# Patient Record
Sex: MALE | NOT HISPANIC OR LATINO | Employment: OTHER | URBAN - METROPOLITAN AREA
[De-identification: names, ages, dates, MRNs, and addresses within clinical notes are randomized per-mention and may not be internally consistent; named-entity substitution may affect disease eponyms.]

---

## 2019-06-12 ENCOUNTER — TRANSFERRED RECORDS (OUTPATIENT)
Dept: HEALTH INFORMATION MANAGEMENT | Facility: CLINIC | Age: 39
End: 2019-06-12

## 2022-02-05 ENCOUNTER — HOSPITAL ENCOUNTER (EMERGENCY)
Facility: CLINIC | Age: 42
Discharge: HOME | End: 2022-02-05
Attending: EMERGENCY MEDICINE | Admitting: EMERGENCY MEDICINE
Payer: COMMERCIAL

## 2022-02-05 ENCOUNTER — NURSE TRIAGE (OUTPATIENT)
Dept: NURSING | Facility: CLINIC | Age: 42
End: 2022-02-05
Payer: COMMERCIAL

## 2022-02-05 VITALS
HEART RATE: 73 BPM | RESPIRATION RATE: 18 BRPM | HEIGHT: 66 IN | TEMPERATURE: 97.1 F | WEIGHT: 140 LBS | SYSTOLIC BLOOD PRESSURE: 113 MMHG | BODY MASS INDEX: 22.5 KG/M2 | OXYGEN SATURATION: 100 % | DIASTOLIC BLOOD PRESSURE: 67 MMHG

## 2022-02-05 DIAGNOSIS — K02.9 DENTAL DECAY: ICD-10-CM

## 2022-02-05 DIAGNOSIS — K08.89 PAIN, DENTAL: ICD-10-CM

## 2022-02-05 PROCEDURE — 64400 NJX AA&/STRD TRIGEMINAL NRV: CPT

## 2022-02-05 PROCEDURE — 250N000013 HC RX MED GY IP 250 OP 250 PS 637: Performed by: EMERGENCY MEDICINE

## 2022-02-05 PROCEDURE — 99283 EMERGENCY DEPT VISIT LOW MDM: CPT | Mod: 25

## 2022-02-05 RX ORDER — OXYCODONE AND ACETAMINOPHEN 5; 325 MG/1; MG/1
2 TABLET ORAL ONCE
Status: COMPLETED | OUTPATIENT
Start: 2022-02-05 | End: 2022-02-05

## 2022-02-05 RX ORDER — OXYCODONE HYDROCHLORIDE 5 MG/1
10 TABLET ORAL ONCE
Status: DISCONTINUED | OUTPATIENT
Start: 2022-02-05 | End: 2022-02-05

## 2022-02-05 RX ADMIN — OXYCODONE HYDROCHLORIDE AND ACETAMINOPHEN 2 TABLET: 5; 325 TABLET ORAL at 12:33

## 2022-02-05 ASSESSMENT — MIFFLIN-ST. JEOR: SCORE: 1477.79

## 2022-02-05 NOTE — DISCHARGE INSTRUCTIONS
Many of these clinics offer a sliding fee option for patients that qualify, and see patients on a walk-in or same day basis. Please call each clinic directly. As services, hours, fees and policies vary greatly.    [place]:  [facility]     [phone]  [facility] ([facility]) [phone]  [facility]  [phone]  [facility]      [phone]   [facility]    [phone]  [facility]  [phone]  [facility] [phone]  [facility]     [phone]  [facility]   [phone]  [facility] (cash only)   [phone]  [facility]    [phone] (adults)          [phone] (children)    [place]:  [facility]     [phone]; [phone]  [facility]     [phone]  [facility]     [phone]  [facility] (free, limited)    [phone]    Multiple Locations:  [facility]       [phone]            Discharge Instructions  Dental Pain    You have been seen today for a toothache. Your pain may be caused by an exposed nerve, an infection (pulpitis), a root abscess (pocket of pus), or other problems. You will need to see a dentist for a solution to your tooth problem. Emergency Department care is only to help control your problem until you can see a dentist; we cannot provide complete dental care.  Today, we did not find any sign that your toothache was caused by any dangerous or life-threatening condition, but sometimes symptoms develop over time and cannot be found during an emergency visit, so it is very important that you follow up with your dentist.      Generally, every Emergency Department visit should have a follow-up clinic visit with either a primary or a specialty clinic/provider. Please  follow-up as instructed by your emergency provider today.    Return to the Emergency Department if:  You develop a new fever over 100.4 F.  You cannot open your mouth normally, cannot move your tongue well, or cannot swallow.  You have new or increased swelling of your face or neck.  You develop drainage of pus or foul smelling material from around your tooth.  What can I do to help myself?  Take any antibiotic the provider may have prescribed for you today.  Avoid very hot or very cold foods as both can cause pain.  Make an appointment to see a dentist as soon as possible. Dentists are generally not  on-staff  at hospitals so we cannot  refer  to you to dentist but we may be able to provide a list of dental clinics to help you.  If you were given a prescription for medicine here today, be sure to read all of the information (including the package insert) that comes with your prescription.  This will include important information about the medicine, its side effects, and any warnings that you need to know about.  The pharmacist who fills the prescription can provide more information and answer questions you may have about the medicine.  If you have questions or concerns that the pharmacist cannot address, please call or return to the Emergency Department.   Remember that you can always come back to the Emergency Department if you are not able to see your regular provider in the amount of time listed above, if you get any new symptoms, or if there is anything that worries you.

## 2022-02-05 NOTE — ED TRIAGE NOTES
Here for dental pain.  States was placed on antibiotics yesterday and having difficulty getting into a dentist due to insurance after recently moving.
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
## 2022-02-06 NOTE — TELEPHONE ENCOUNTER
Call received from Cande pressley  Verbal Consent from pt to speak with Cande    He is calling regarding continuing, severe dental pain.  He has a dental appointment on Mon.  He needs help with pain control until then.    Srinivasa was seen in the ER twice in the past 2 days, for severe dental pain.  2/4/22 - [facility] - started on Amoxicillin  2/5/22 - [facility] - Dental block & single dose of Percocet.    ER advised    COVID 19 Nurse Triage Plan/Patient Instructions    Please be aware that novel coronavirus (COVID-19) may be circulating in the community. If you develop symptoms such as fever, cough, or SOB or if you have concerns about the presence of another infection including coronavirus (COVID-19), please contact your health care provider or visit [email or web address].     Disposition/Instructions    ED Visit recommended. Follow protocol based instructions.     Bring Your Own Device:  Please also bring your smart device(s) (smart phones, tablets, laptops) and their charging cables for your personal use and to communicate with your care team during your visit.    Thank you for taking steps to prevent the spread of this virus.  o Limit your contact with others.  o Wear a simple mask to cover your cough.  o Wash your hands well and often.    Resources    [facility]: About COVID-19: [email or web address]    CDC: What to Do If You're Sick: www.cdc.gov/coronavirus/2019-ncov/about/steps-when-sick.html    CDC: Ending Home Isolation: www.cdc.gov/coronavirus/2019-ncov/hcp/disposition-in-home-patients.html     CDC: Caring for Someone: www.cdc.gov/coronavirus/2019-ncov/if-you-are-sick/care-for-someone.html     [facility]: Interim Guidance for Hospital Discharge to Home: www.health.[facility].[place]/diseases/coronavirus/hcp/hospdischarge.pdf    [facility] clinical trials (COVID-19 research studies): [email or web address]/umn-clinical-trials     Below are the COVID-19 hotlines at the [facility]  Health ([facility]). Interpreters are available.   o For health questions: Call [phone] or [phone] (7 a.m. to 7 p.m.)  o For questions about schools and childcare: Call [phone] or [phone] (7 a.m. to 7 p.m.)     Cecilia Monsivais RN  [facility]      Reason for Disposition    [1] SEVERE pain (e.g., excruciating, unable to do any normal activities) AND [2] not improved 2 hours after pain medicine    Protocols used: TOOTHACHE-A-AH

## 2022-02-22 ENCOUNTER — VIRTUAL VISIT (OUTPATIENT)
Dept: FAMILY MEDICINE | Facility: CLINIC | Age: 42
End: 2022-02-22
Payer: COMMERCIAL

## 2022-02-22 DIAGNOSIS — N28.89 RENAL MASS, RIGHT: Primary | ICD-10-CM

## 2022-02-22 PROCEDURE — 99203 OFFICE O/P NEW LOW 30 MIN: CPT | Mod: 95 | Performed by: FAMILY MEDICINE
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
## 2022-03-15 ENCOUNTER — TELEPHONE (OUTPATIENT)
Dept: FAMILY MEDICINE | Facility: CLINIC | Age: 42
End: 2022-03-15
Payer: COMMERCIAL

## 2022-03-15 NOTE — TELEPHONE ENCOUNTER
Not really sure what this means (MIL?).    Can use any open space to schedule an in person clinic visit if that is what the patient wants.    Fabien Bustillos MD

## 2022-03-15 NOTE — TELEPHONE ENCOUNTER
TC pool  Please help pt to set up an appointment    See messages     Thanks  Alexandria Solis RN   [facility]

## 2022-03-15 NOTE — TELEPHONE ENCOUNTER
Dr Bustillos,    Pt MIL called and wants to have pt schedule an appointment for establishing care, you did a v visit with him on 2/22/22    Please advise    Call Bárbara at   She will relay message to pt    Alexandria Solis RN   [facility]

## 2022-04-28 ENCOUNTER — LAB (OUTPATIENT)
Dept: LAB | Facility: CLINIC | Age: 42
End: 2022-04-28
Payer: COMMERCIAL

## 2022-04-28 ENCOUNTER — OFFICE VISIT (OUTPATIENT)
Dept: INTERNAL MEDICINE | Facility: CLINIC | Age: 42
End: 2022-04-28
Payer: COMMERCIAL

## 2022-04-28 VITALS
WEIGHT: 139 LBS | OXYGEN SATURATION: 99 % | DIASTOLIC BLOOD PRESSURE: 65 MMHG | HEIGHT: 66 IN | SYSTOLIC BLOOD PRESSURE: 100 MMHG | RESPIRATION RATE: 16 BRPM | BODY MASS INDEX: 22.34 KG/M2 | HEART RATE: 83 BPM

## 2022-04-28 DIAGNOSIS — N28.1 KIDNEY CYSTS: ICD-10-CM

## 2022-04-28 DIAGNOSIS — J32.9 CHRONIC SINUSITIS, UNSPECIFIED LOCATION: ICD-10-CM

## 2022-04-28 DIAGNOSIS — Z00.00 PREVENTATIVE HEALTH CARE: ICD-10-CM

## 2022-04-28 DIAGNOSIS — M79.18 MUSCULOSKELETAL PAIN: ICD-10-CM

## 2022-04-28 DIAGNOSIS — K76.9 LIVER LESION: ICD-10-CM

## 2022-04-28 DIAGNOSIS — Z00.00 PREVENTATIVE HEALTH CARE: Primary | ICD-10-CM

## 2022-04-28 DIAGNOSIS — F99 MENTAL HEALTH DISORDER: ICD-10-CM

## 2022-04-28 LAB
ALBUMIN SERPL-MCNC: 4.3 G/DL (ref 3.4–5)
ALP SERPL-CCNC: 62 U/L (ref 40–150)
ALT SERPL W P-5'-P-CCNC: 22 U/L (ref 0–70)
ANION GAP SERPL CALCULATED.3IONS-SCNC: 4 MMOL/L (ref 3–14)
AST SERPL W P-5'-P-CCNC: 15 U/L (ref 0–45)
BASOPHILS # BLD AUTO: 0 10E3/UL (ref 0–0.2)
BASOPHILS NFR BLD AUTO: 1 %
BILIRUB SERPL-MCNC: 0.4 MG/DL (ref 0.2–1.3)
BUN SERPL-MCNC: 15 MG/DL (ref 7–30)
CALCIUM SERPL-MCNC: 9.3 MG/DL (ref 8.5–10.1)
CHLORIDE BLD-SCNC: 107 MMOL/L (ref 94–109)
CHOLEST SERPL-MCNC: 157 MG/DL
CO2 SERPL-SCNC: 30 MMOL/L (ref 20–32)
CREAT SERPL-MCNC: 0.88 MG/DL (ref 0.52–1.25)
EOSINOPHIL # BLD AUTO: 0.1 10E3/UL (ref 0–0.7)
EOSINOPHIL NFR BLD AUTO: 1 %
ERYTHROCYTE [DISTWIDTH] IN BLOOD BY AUTOMATED COUNT: 12 % (ref 10–15)
FASTING STATUS PATIENT QL REPORTED: NORMAL
GFR SERPL CREATININE-BSD FRML MDRD: >90 ML/MIN/1.73M2
GLUCOSE BLD-MCNC: 102 MG/DL (ref 70–99)
HBA1C MFR BLD: 5.3 % (ref 0–5.6)
HCT VFR BLD AUTO: 46.4 % (ref 35–53)
HDLC SERPL-MCNC: 53 MG/DL
HGB BLD-MCNC: 15.1 G/DL (ref 11.7–17.7)
HIV 1+2 AB+HIV1 P24 AG SERPL QL IA: NONREACTIVE
IMM GRANULOCYTES # BLD: 0 10E3/UL
IMM GRANULOCYTES NFR BLD: 0 %
INR PPP: 1.06 (ref 0.85–1.15)
LDLC SERPL CALC-MCNC: 91 MG/DL
LYMPHOCYTES # BLD AUTO: 1.5 10E3/UL (ref 0.8–5.3)
LYMPHOCYTES NFR BLD AUTO: 21 %
MCH RBC QN AUTO: 32 PG (ref 26.5–33)
MCHC RBC AUTO-ENTMCNC: 32.5 G/DL (ref 31.5–36.5)
MCV RBC AUTO: 98 FL (ref 78–100)
MONOCYTES # BLD AUTO: 0.6 10E3/UL (ref 0–1.3)
MONOCYTES NFR BLD AUTO: 8 %
NEUTROPHILS # BLD AUTO: 4.8 10E3/UL (ref 1.6–8.3)
NEUTROPHILS NFR BLD AUTO: 69 %
NONHDLC SERPL-MCNC: 104 MG/DL
NRBC # BLD AUTO: 0 10E3/UL
NRBC BLD AUTO-RTO: 0 /100
PLATELET # BLD AUTO: 209 10E3/UL (ref 150–450)
POTASSIUM BLD-SCNC: 4.8 MMOL/L (ref 3.4–5.3)
PROT SERPL-MCNC: 7 G/DL (ref 6.8–8.8)
RBC # BLD AUTO: 4.72 10E6/UL (ref 3.8–5.9)
SODIUM SERPL-SCNC: 141 MMOL/L (ref 133–144)
TRIGL SERPL-MCNC: 67 MG/DL
WBC # BLD AUTO: 7 10E3/UL (ref 4–11)

## 2022-04-28 PROCEDURE — 83036 HEMOGLOBIN GLYCOSYLATED A1C: CPT | Performed by: PATHOLOGY

## 2022-04-28 PROCEDURE — 80061 LIPID PANEL: CPT | Performed by: PATHOLOGY

## 2022-04-28 PROCEDURE — 87389 HIV-1 AG W/HIV-1&-2 AB AG IA: CPT | Performed by: INTERNAL MEDICINE

## 2022-04-28 PROCEDURE — 99204 OFFICE O/P NEW MOD 45 MIN: CPT | Mod: GC

## 2022-04-28 PROCEDURE — 80053 COMPREHEN METABOLIC PANEL: CPT | Performed by: PATHOLOGY

## 2022-04-28 PROCEDURE — 85025 COMPLETE CBC W/AUTO DIFF WBC: CPT | Performed by: PATHOLOGY

## 2022-04-28 PROCEDURE — 36415 COLL VENOUS BLD VENIPUNCTURE: CPT | Performed by: PATHOLOGY

## 2022-04-28 PROCEDURE — 87902 NFCT AGT GNTYP ALYS HEP C: CPT | Performed by: INTERNAL MEDICINE

## 2022-04-28 PROCEDURE — 86803 HEPATITIS C AB TEST: CPT | Performed by: INTERNAL MEDICINE

## 2022-04-28 PROCEDURE — 85610 PROTHROMBIN TIME: CPT | Performed by: PATHOLOGY

## 2022-04-28 ASSESSMENT — PAIN SCALES - GENERAL: PAINLEVEL: EXTREME PAIN (8)

## 2022-04-28 NOTE — LETTER
May 2, 2022      Srinivasa Matos  [address]  [place] [address]      Oswaldo Bernabe,    It was nice to meet you the other day. I wanted to let you know that all of your labs drawn last week look fine. Your Hepatitis C antibody came back positive but the actual viral test was negative. This means you likely had Hepatitis C in the past but it is now resolved (good news!). Please let us know if you have any questions.    Sincerely,    Abdulaziz Bernabe MD    Resulted Orders   Hepatitis C Screen Reflex to HCV RNA Quant and Genotype   Result Value Ref Range    Hepatitis C Antibody Reactive (A) Nonreactive      Comment:      A reactive result indicates one of the following   1) Current HCV infection   2) Past HCV infection that has resolved or   3) False positivity.     The CDC recommends that a reactive result should be followed by Nucleic acid testing for HCV RNA. If HCV RNA is detected, that indicates current HCV infection.   If HCV RNA is not detected, that indicates either past, resolved HCV infection, or false HCV antibody positivity.    Narrative    Assay performance characteristics have not been established for newborns, infants, and children.   HIV Antigen Antibody Combo   Result Value Ref Range    HIV Antigen Antibody Combo Nonreactive Nonreactive      Comment:      HIV-1 p24 Ag & HIV-1/HIV-2 Ab Not Detected   Comprehensive metabolic panel   Result Value Ref Range    Sodium 141 133 - 144 mmol/L    Potassium 4.8 3.4 - 5.3 mmol/L    Chloride 107 94 - 109 mmol/L      Comment:      0-20 years:       Female:  mmol/L  Male:    mmol/L       20 years and older:   Female:  mmol/L   Male:    mmol/L      Carbon Dioxide (CO2) 30 20 - 32 mmol/L      Comment:      This result was previously suppressed from the chart.    Anion Gap 4 3 - 14 mmol/L      Comment:      This result was previously suppressed from the chart.    Urea Nitrogen 15 7 - 30 mg/dL      Comment:      Female  0 to 15 days            3-23  15 days to 1 year      3-17  1 to 10 years          9-22  10 to 19 years         7-19  19 years and older     7-30    Male  0 to 15 days           3-23  15 days to 1 year      3-17  1 to 10 years          9-22  10 to 19 years         7-21  19 years and older     7-30   This result was previously suppressed from the chart.    Creatinine 0.88 0.52 - 1.25 mg/dL      Comment:      20 y and older Female 0.52-1.04 mg/dL  20 y and older Male 0.66-1.25 mg/dL    Varies with the amount of muscle mass present.    GICH  Female: 0.6-1.2 mg/dL  Male: 0.7-1.3 mg/dL     This result was previously suppressed from the chart.    Calcium 9.3 8.5 - 10.1 mg/dL      Comment:      This result was previously suppressed from the chart.    Glucose 102 (H) 70 - 99 mg/dL      Comment:      This result was previously suppressed from the chart.    Alkaline Phosphatase 62 40 - 150 U/L      Comment:      Female:    0-3 years  110-320 U/L  4-9 years  150-420 U/L  10-11 years  130-560 U/L  12-13 years  105-420 U/L  14-15 years   U/L  16 years and older   U/L      Male:  0-3 years  110-320 U/L  4-9 years  150-420 U/L  10-15 years  130-530 U/L  16-19 years   U/L  20 years and older   U/L     This result was previously suppressed from the chart.    AST 15 0 - 45 U/L      Comment:      This result was previously suppressed from the chart.    ALT 22 0 - 70 U/L      Comment:      Female    All ages 0-50 U/L    Male    0 - 19 years       0-50 U/L  20 years and older 0-70 U/L          This result was previously suppressed from the chart.    Protein Total 7.0 6.8 - 8.8 g/dL      Comment:      This result was previously suppressed from the chart.    Albumin 4.3 3.4 - 5.0 g/dL      Comment:      This result was previously suppressed from the chart.    Bilirubin Total 0.4 0.2 - 1.3 mg/dL      Comment:      This result was previously suppressed from the chart.    GFR Estimate >90 >60 mL/min/1.73m2      Comment:      GFR not  calculated when sex unspecified or nonbinary.  Effective December 21, 2021 eGFRcr in adults is calculated using the 2021 CKD-EPI creatinine equation which includes age and gender (Citlaly et al., [place], DOI: 10.1056/MNFKam2337500)   This result was previously suppressed from the chart.    Narrative    The sex of this patient cannot be reliably determined based on discrepancies in demographics (legal sex, sex assigned at birth, gender identity).  Both male and female reference ranges are provided where applicable.  Careful evaluation of the patient s results as compared to the gender specific reference intervals is required in this setting.    INR   Result Value Ref Range    INR 1.06 0.85 - 1.15   Hemoglobin A1c   Result Value Ref Range    Hemoglobin A1C 5.3 0.0 - 5.6 %      Comment:      Normal <5.7%   Prediabetes 5.7-6.4%    Diabetes 6.5% or higher     Note: Adopted from ADA consensus guidelines.   Lipid Profile NON-FASTING   Result Value Ref Range    Cholesterol 157 <200 mg/dL    Triglycerides 67 <150 mg/dL    Direct Measure HDL 53 >=40 mg/dL    LDL Cholesterol Calculated 91 <=100 mg/dL    Non HDL Cholesterol 104 <130 mg/dL    Patient Fasting > 8hrs? Unknown     Narrative    The sex of this patient cannot be reliably determined based on discrepancies in demographics (legal sex, sex assigned at birth, gender identity).  Both male and female reference ranges are provided where applicable.  Careful evaluation of the patient s results as compared to the gender specific reference intervals is required in this setting.   Cholesterol  Desirable:  <200 mg/dL    Triglycerides  Normal:  Less than 150 mg/dL  Borderline High:  150-199 mg/dL  High:  200-499 mg/dL  Very High:  Greater than or equal to 500 mg/dL    Direct Measure HDL  Female:  Greater than or equal to 50 mg/dL   Male:  Greater than or equal to 40 mg/dL    LDL Cholesterol  Desirable:  <100mg/dL  Above Desirable:  100-129 mg/dL   Borderline High:  130-159 mg/dL    High:  160-189 mg/dL   Very High:  >= 190 mg/dL    Non HDL Cholesterol  Desirable:  130 mg/dL  Above Desirable:  130-159 mg/dL  Borderline High:  160-189 mg/dL  High:  190-219 mg/dL  Very High:  Greater than or equal to 220 mg/dL   CBC with platelets and differential   Result Value Ref Range    WBC Count 7.0 4.0 - 11.0 10e3/uL    RBC Count 4.72 3.80 - 5.90 10e6/uL      Comment:      Reference Range:                                                     Female 3.80-5.20 10e6/uL                                      Male 4.40-5.90 10e6u/L    Hemoglobin 15.1 11.7 - 17.7 g/dL      Comment:      Reference Range:                                                     Female 11.7-15.7 g/dL                                      Male 13.3-17.7 g/dL    Hematocrit 46.4 35.0 - 53.0 %      Comment:      Reference Range:                                                     Female 35.0-47.0 %                                            Male 40.0-54.0 %    MCV 98 78 - 100 fL    MCH 32.0 26.5 - 33.0 pg    MCHC 32.5 31.5 - 36.5 g/dL    RDW 12.0 10.0 - 15.0 %    Platelet Count 209 150 - 450 10e3/uL    % Neutrophils 69 %    % Lymphocytes 21 %    % Monocytes 8 %    % Eosinophils 1 %    % Basophils 1 %    % Immature Granulocytes 0 %    NRBCs per 100 WBC 0 <1 /100    Absolute Neutrophils 4.8 1.6 - 8.3 10e3/uL    Absolute Lymphocytes 1.5 0.8 - 5.3 10e3/uL    Absolute Monocytes 0.6 0.0 - 1.3 10e3/uL    Absolute Eosinophils 0.1 0.0 - 0.7 10e3/uL    Absolute Basophils 0.0 0.0 - 0.2 10e3/uL    Absolute Immature Granulocytes 0.0 <=0.4 10e3/uL    Absolute NRBCs 0.0 10e3/uL    Narrative    The sex of this patient cannot be reliably determined based on discrepancies in demographics (legal sex, sex assigned at birth, gender identity).  Both male and female reference ranges are provided where applicable.  Careful evaluation of the patient s results as compared to the gender specific reference intervals is required in this setting.    Hepatitis C RNA,  Quantitative by PCR with Confirmatory Reflex to Genotyping   Result Value Ref Range    Hepatitis C RNA IU/mL Not Detected Not Detected IU/mL    Narrative    The KASSANDRA AmpliPrep/KASSANDRA TaqMan HCV test is a FDA-approved in vitro nucleic acid amplification test for the quantitation of HCV DNA in human plasma (EDTA plasma) or serum using the KASSANDRA AmpliPrep instrument for automated viral nucleic acid extraction and the KASSANDRA TaqMan for the automated real-time PCR amplification and detection of viral nucleic acid target. Titer results are reported in International Units/mL (IU/mL) using the 1st WHO International standard for HBV for nucleic acid amplification assays.

## 2022-04-28 NOTE — NURSING NOTE
Srinivasa Matos is a 41 year old adult patient that presents today in clinic for the following:    Chief Complaint   Patient presents with     Establish Care     Pt comes into clinic to establish care     Pain     Discuss chronic pain in left leg     Kidney Problem     Discuss cyst on kidney per pt     Medication Request     The patient's allergies and medications were reviewed as noted. A set of vitals were recorded as noted without incident. The patient does not have any other questions for the provider.    Lena Akers, MIKA at 7:20 AM on 4/28/2022
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
## 2022-04-29 LAB — HCV AB SERPL QL IA: REACTIVE

## 2022-04-29 NOTE — TELEPHONE ENCOUNTER
FUTURE VISIT INFORMATION      FUTURE VISIT INFORMATION:    Date: 22    Time: 2PM    Location: [facility]  REFERRAL INFORMATION:    Referring provider:  Nasim Hinton MD    Referring providers clinic:  [facility]     Reason for visit/diagnosis  per Pt, chronic sinusitis, referral from Dr Nasim Hinton, recs in [facility]    RECORDS REQUESTED FROM:       Clinic name Comments Records Status Imaging Status   [facility]  22 note and referral from Nasim Hinton MD [facility]   MRN: [id number]    [place] [address]  Phone: [phone]  Med recs fx. [phone] CT Head and CT Cervical Spine   2/10/14 CT HEAD     *trackin Care everywhere  req 22- received 22 10:54AM Sent a fax to [facility] for images - Amay   22 10:51AM imaging disc received - Amay

## 2022-05-01 LAB — HCV RNA SERPL NAA+PROBE-ACNC: NOT DETECTED IU/ML

## 2022-05-02 NOTE — RESULT ENCOUNTER NOTE
Oswaldo Bernabe, it was nice to meet you the other day. I wanted to let you know that all of your labs drawn last week look fine. Your Hepatitis C antibody came back positive but the actual viral test was negative. This means you likely had Hepatitis C in the past but it is now resolved (good news!). Please let us know if you have any questions.

## 2022-06-02 ENCOUNTER — VIRTUAL VISIT (OUTPATIENT)
Dept: BEHAVIORAL HEALTH | Facility: CLINIC | Age: 42
End: 2022-06-02
Attending: INTERNAL MEDICINE
Payer: COMMERCIAL

## 2022-06-02 DIAGNOSIS — F99 MENTAL HEALTH DISORDER: ICD-10-CM

## 2022-06-02 DIAGNOSIS — Z91.199 NO-SHOW FOR APPOINTMENT: Primary | ICD-10-CM

## 2022-06-02 NOTE — PROGRESS NOTES
This patient was a no show for this scheduled appointment.  They do have an in-person appointment with their new PCP today, and I will reach out to offer to connect again in the future.    Naveed Oakley, [facility]

## 2022-07-22 ENCOUNTER — PRE VISIT (OUTPATIENT)
Dept: OTOLARYNGOLOGY | Facility: CLINIC | Age: 42
End: 2022-07-22
Payer: COMMERCIAL

## 2022-09-12 ENCOUNTER — HOSPITAL ENCOUNTER (EMERGENCY)
Age: 42
Discharge: HOME | End: 2022-09-13
Attending: STUDENT IN AN ORGANIZED HEALTH CARE EDUCATION/TRAINING PROGRAM
Payer: MEDICAID

## 2022-09-12 DIAGNOSIS — F32.A DEPRESSION, UNSPECIFIED DEPRESSION TYPE: Primary | ICD-10-CM

## 2022-09-12 LAB
A/G RATIO: 2 (ref 1.1–2.2)
ACETAMINOPHEN LEVEL: <5 UG/ML (ref 10–30)
ALBUMIN SERPL-MCNC: 5.2 G/DL (ref 3.4–5)
ALP BLD-CCNC: 76 U/L (ref 40–129)
ALT SERPL-CCNC: 15 U/L (ref 10–40)
AMPHETAMINE SCREEN, URINE: ABNORMAL
ANION GAP SERPL CALCULATED.3IONS-SCNC: 14 MMOL/L (ref 3–16)
AST SERPL-CCNC: 19 U/L (ref 15–37)
BARBITURATE SCREEN URINE: ABNORMAL
BASOPHILS ABSOLUTE: 0 K/UL (ref 0–0.2)
BASOPHILS RELATIVE PERCENT: 0.4 %
BENZODIAZEPINE SCREEN, URINE: ABNORMAL
BILIRUB SERPL-MCNC: 0.5 MG/DL (ref 0–1)
BUN BLDV-MCNC: 21 MG/DL (ref 7–20)
CALCIUM SERPL-MCNC: 9.9 MG/DL (ref 8.3–10.6)
CANNABINOID SCREEN URINE: POSITIVE
CHLORIDE BLD-SCNC: 102 MMOL/L (ref 99–110)
CO2: 24 MMOL/L (ref 21–32)
COCAINE METABOLITE SCREEN URINE: ABNORMAL
CREAT SERPL-MCNC: 0.9 MG/DL (ref 0.9–1.3)
EOSINOPHILS ABSOLUTE: 0.1 K/UL (ref 0–0.6)
EOSINOPHILS RELATIVE PERCENT: 0.6 %
ETHANOL: NORMAL MG/DL (ref 0–0.08)
FENTANYL SCREEN, URINE: ABNORMAL
GFR AFRICAN AMERICAN: >60
GFR NON-AFRICAN AMERICAN: >60
GLUCOSE BLD-MCNC: 108 MG/DL (ref 70–99)
HCT VFR BLD CALC: 43.3 % (ref 40.5–52.5)
HEMOGLOBIN: 14.8 G/DL (ref 13.5–17.5)
LYMPHOCYTES ABSOLUTE: 1.5 K/UL (ref 1–5.1)
LYMPHOCYTES RELATIVE PERCENT: 14.7 %
Lab: ABNORMAL
MCH RBC QN AUTO: 33.7 PG (ref 26–34)
MCHC RBC AUTO-ENTMCNC: 34.3 G/DL (ref 31–36)
MCV RBC AUTO: 98.2 FL (ref 80–100)
METHADONE SCREEN, URINE: ABNORMAL
MONOCYTES ABSOLUTE: 0.9 K/UL (ref 0–1.3)
MONOCYTES RELATIVE PERCENT: 8.8 %
NEUTROPHILS ABSOLUTE: 7.5 K/UL (ref 1.7–7.7)
NEUTROPHILS RELATIVE PERCENT: 75.5 %
OPIATE SCREEN URINE: ABNORMAL
OXYCODONE URINE: ABNORMAL
PDW BLD-RTO: 13.1 % (ref 12.4–15.4)
PH UA: 5
PHENCYCLIDINE SCREEN URINE: ABNORMAL
PLATELET # BLD: 201 K/UL (ref 135–450)
PMV BLD AUTO: 6.8 FL (ref 5–10.5)
POTASSIUM REFLEX MAGNESIUM: 4.1 MMOL/L (ref 3.5–5.1)
RBC # BLD: 4.4 M/UL (ref 4.2–5.9)
SALICYLATE, SERUM: <0.3 MG/DL (ref 15–30)
SARS-COV-2, NAAT: NOT DETECTED
SODIUM BLD-SCNC: 140 MMOL/L (ref 136–145)
TOTAL PROTEIN: 7.8 G/DL (ref 6.4–8.2)
WBC # BLD: 10 K/UL (ref 4–11)

## 2022-09-12 PROCEDURE — 80307 DRUG TEST PRSMV CHEM ANLYZR: CPT

## 2022-09-12 PROCEDURE — 87635 SARS-COV-2 COVID-19 AMP PRB: CPT

## 2022-09-12 PROCEDURE — 85025 COMPLETE CBC W/AUTO DIFF WBC: CPT

## 2022-09-12 PROCEDURE — 82077 ASSAY SPEC XCP UR&BREATH IA: CPT

## 2022-09-12 PROCEDURE — 99283 EMERGENCY DEPT VISIT LOW MDM: CPT

## 2022-09-12 PROCEDURE — 80143 DRUG ASSAY ACETAMINOPHEN: CPT

## 2022-09-12 PROCEDURE — 80179 DRUG ASSAY SALICYLATE: CPT

## 2022-09-12 PROCEDURE — 80053 COMPREHEN METABOLIC PANEL: CPT

## 2022-09-12 PROCEDURE — 36415 COLL VENOUS BLD VENIPUNCTURE: CPT

## 2022-09-12 ASSESSMENT — PAIN - FUNCTIONAL ASSESSMENT: PAIN_FUNCTIONAL_ASSESSMENT: NONE - DENIES PAIN

## 2022-09-13 VITALS
DIASTOLIC BLOOD PRESSURE: 77 MMHG | HEART RATE: 89 BPM | BODY MASS INDEX: 24.11 KG/M2 | OXYGEN SATURATION: 98 % | WEIGHT: 150 LBS | RESPIRATION RATE: 18 BRPM | SYSTOLIC BLOOD PRESSURE: 111 MMHG | TEMPERATURE: 98.4 F | HEIGHT: 66 IN

## 2022-09-13 PROBLEM — Z76.5 MALINGERING: Status: ACTIVE | Noted: 2022-09-13

## 2022-09-13 PROCEDURE — 6370000000 HC RX 637 (ALT 250 FOR IP): Performed by: STUDENT IN AN ORGANIZED HEALTH CARE EDUCATION/TRAINING PROGRAM

## 2022-09-13 PROCEDURE — 99281 EMR DPT VST MAYX REQ PHY/QHP: CPT | Performed by: PSYCHIATRY & NEUROLOGY

## 2022-09-13 RX ORDER — CYCLOBENZAPRINE HCL 10 MG
10 TABLET ORAL ONCE
Status: COMPLETED | OUTPATIENT
Start: 2022-09-13 | End: 2022-09-13

## 2022-09-13 RX ORDER — ACETAMINOPHEN 325 MG/1
650 TABLET ORAL EVERY 6 HOURS PRN
OUTPATIENT
Start: 2022-09-13

## 2022-09-13 RX ORDER — HYDROXYZINE 50 MG/1
50 TABLET, FILM COATED ORAL 3 TIMES DAILY PRN
OUTPATIENT
Start: 2022-09-13

## 2022-09-13 RX ORDER — POLYETHYLENE GLYCOL 3350 17 G
2 POWDER IN PACKET (EA) ORAL
OUTPATIENT
Start: 2022-09-13

## 2022-09-13 RX ORDER — TRAZODONE HYDROCHLORIDE 50 MG/1
50 TABLET ORAL NIGHTLY PRN
OUTPATIENT
Start: 2022-09-13

## 2022-09-13 RX ADMIN — CYCLOBENZAPRINE 10 MG: 10 TABLET, FILM COATED ORAL at 05:24

## 2022-09-13 ASSESSMENT — PAIN DESCRIPTION - ORIENTATION: ORIENTATION: LEFT

## 2022-09-13 ASSESSMENT — PAIN SCALES - GENERAL: PAINLEVEL_OUTOF10: 7

## 2022-09-13 ASSESSMENT — PAIN DESCRIPTION - LOCATION: LOCATION: LEG

## 2022-09-13 NOTE — ED NOTES
Patient resting in bed quietly at this time with no complaints. Safety checks continue.       Inez Buckley RN  09/13/22 9743

## 2022-09-13 NOTE — ED NOTES
Flexeril appears to have been effective for left leg pain. Patient resting with eyes closed and no signs of distress.       Blanca Craft RN  09/13/22 8784
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
## 2022-09-13 NOTE — ED NOTES
Patient resting in bed at this time changing positions occasionally. Safety checks continue.       Eloise Durand, DHRUV  09/13/22 0357

## 2022-09-13 NOTE — ED NOTES
Pt arrived to [facility] with Chin Valdes RN. Pt began screaming almost immediately upon arriving to [facility] stating, \"You all aren't doing shit for me! No one will help me. The  won't fucking do shit to help me get back! I need to get back to [place]! I hate this fucking state! \" Writer explained that this hospital was unable to provide transportation to [place]. Pt stated, \"You can't let me out of here. I'll fucking kill myself if you let me out of here. I don't get paid for another 20 something days. \" Staff attempted to redirect pt from screaming and set expectations for behavior while in ED. Pt continued to scream and curse at staff. Security present in [facility]. Will monitor pt.       [address] [place] [address]  09/12/22 0023

## 2022-09-13 NOTE — ED NOTES
Talked with patient about shelter situation. Patient became angry and irritable about not having for sure shelter. Patient made statement \" what am I supposed to do if I get there and can't get in\".       Rukhsana Saenz RN  09/13/22 3517

## 2022-09-13 NOTE — ED NOTES
Patient has order to discharge. Patient given discharge instructions. Patient states he understands. Patient left to lobby to await [employer].      Mary Esquivel RN  09/13/22 8229

## 2022-09-13 NOTE — ED NOTES
Patient given flexeril 10 mg oral for left leg pain 7 out of 10 and explained to patient.       Sita Liang RN  09/13/22 1440

## 2022-09-13 NOTE — ED NOTES
Pt is currently laying  in room. Respirations are even and easy. Independently turning self for comfort. Currently laying on left side. No s/s of distress.       Tejal Banerjee RN  09/13/22 0119

## 2022-09-13 NOTE — DISCHARGE INSTRUCTIONS
Follow-up with primary care physician as well as outpatient resources given to you by mental health. Return to emergency department for anyone to hurt her self or others, hallucinations, chest pain, shortness of breath, abdominal pain, fevers or any new change or worsening symptoms. We are always here for reevaluation never hesitate to return. The National Suicide and Crisis Hotline Number is 65. You can call, chat, or text this number at any time to access emergency mental health services. Shelters:    Emergency [facility]:  [address] [place] [address]  Location: [facility] in [place]  Phone: [phone]  Other: Operated by [address] [place] [address] [employer] and located at the [facility], typically operates from December - February each year and usually serves 600-700 homeless people. Open 7PM - 6AM to men and women. The [facility] is a 10,000 square foot area with its own separate entrance and can house 200 individuals each evening.     [facility]:  [address] [place] [address] St: [facility], [phone] (men) or [phone] (women)  [facility] [phone] ($15/week)  [facility] [phone] (women only)  [place] [phone] (HIV positive only)  [address] [phone] (medical illness only)  Arley Wagoner [phone] (women only domestic violence shelter)  [facility], [phone] or [phone](ITRNEHRM violence shelter)  [facility] [phone] (women only domestic violence shelter)  [employer] [phone] (women only)  [facility] [phone] (women only $60-$75/week)  [address] [place] [address] [phone] (men only)  [address] [facility] [phone] (men only)  [facility] [phone] (men only, veterans)  [place]:  [place] [address] [phone]  [facility] [phone] (women only domestic violence shelter)    [place]:  [place] [address] [phone]  [facility] [phone] (women only domestic violence shelter)   [facility] [phone] (women only domestic violence shelter)    [facility]:  [facility] [phone] ([address], [address], [facility] [phone], M-F, 9-4)    [place]:  Belvia Bosworth [address] [place] [address] Blvd: [facility] [phone]  [facility] [phone]  [facility] [phone]    Other Shelters:  [address] [place] [address] Street: [facility], [phone]  Transitions: [facility], [phone] (dv)     You are being referred to [facility] for mental health treatment. [facility] is located at [address]. Go to the 2nd floor,  Monday-Friday from 9am to 3pm.  When you go, please bring a photo ID, proof of residence in [facility], your insurance card, and these discharge instructions so they can verify you were hospitalized, as well as any medications you are taking. If you have any questions about this, please contact [facility] intake department at [phone]. You are being referred for outpatient treatment at [facility] ([facility]). [facility] has offices located in [facility], [place], and [place]. Please call them directly to schedule an intake appointment. You can reach them at [phone] (Liz/[place]) or [phone] Veronica Pastor    When you go, please bring a photo i.d., proof of residence, proof of household income, insurance cards (if you have them), and this paperwork.

## 2022-09-13 NOTE — ED NOTES
Patient resting quietly in bed with no unsafe behaviors. Safety checks continue.       Jennyfer Duran RN  09/13/22 4368

## 2022-09-13 NOTE — ED NOTES
Level of Care Disposition: Discharge      Patient was seen by ED provider and [facility] staff. The case was presented to psychiatric provider on-call Dr. Juan Abraham. Based on the ED evaluation and information presented to the provider by Suman Quintana RN , it is the recommendation of the on call psychiatric provider that the patient be discharged from a psychiatric standpoint.            Chris Willett RN  09/13/22 [address], RN  09/13/22 9514

## 2022-09-13 NOTE — ED NOTES
Call placed to CAP line [phone]. No answer message left.       Sindhu Young RN  09/13/22 2759
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
## 2022-09-13 NOTE — ED NOTES
Patient asleep. Awaiting placement in shelter or plan. Will continue to monitor patient.       Sindhu Young RN  09/13/22 5691

## 2022-09-13 NOTE — ED PROVIDER NOTES
[address] ED      CHIEF COMPLAINT  Psychiatric Evaluation (Pt on hold. Pt is homeless and from [place]. Patient came to [place] on bus and has no where to go or a way to get home. Patient stated that he would just end his life if no one would help him. )     HISTORY OF PRESENT ILLNESS  Favian Davis is a 42 y.o. male  who presents to the ED complaining of suicidal ideation. Patient was brought in on a hold. He is homeless and from [place] and states that somebody who he was staying with still has disability money and now he cannot get back to [place] so he might as well as kill himself. He denies any plan for killing himself but states \"if I cannot get back and might as well just kill myself and die. \"  Denies other complaints or concerns. Denies homicidal ideation. No other complaints, modifying factors or associated symptoms. I have reviewed the following from the nursing documentation. History reviewed. No pertinent past medical history. History reviewed. No pertinent surgical history. History reviewed. No pertinent family history.   Social History     Socioeconomic History    Marital status: Not on file     Spouse name: Not on file    Number of children: Not on file    Years of education: Not on file    Highest education level: Not on file   Occupational History    Not on file   Tobacco Use    Smoking status: Every Day     Types: Cigarettes    Smokeless tobacco: Not on file   Substance and Sexual Activity    Alcohol use: Not Currently    Drug use: Yes     Types: Marijuana Maurita Handsome)    Sexual activity: Not on file   Other Topics Concern    Not on file   Social History Narrative    Not on file     Social Determinants of Health     Financial Resource Strain: Not on file   Food Insecurity: Not on file   Transportation Needs: Not on file   Physical Activity: Not on file   Stress: Not on file   Social Connections: Not on file   Intimate Partner Violence: Not on file   Housing Stability: Not on file     No current facility-administered medications for this encounter. No current outpatient medications on file. No Known Allergies    REVIEW OF SYSTEMS  10 systems reviewed, pertinent positives per HPI otherwise noted to be negative. PHYSICAL EXAM  /70   Pulse (!) 101   Temp 98 °F (36.7 °C) (Oral)   Resp 16   Ht 5' 6\" (1.676 m)   Wt 150 lb (68 kg)   SpO2 97%   BMI 24.21 kg/m²    GENERAL APPEARANCE: Awake and alert. Cooperative. No acute distress. HENT: Normocephalic. Atraumatic. NECK: Supple. EYES: PERRL. EOM's grossly intact. HEART/CHEST: RRR. No murmurs. LUNGS: Respirations unlabored. CTAB. Good air exchange. Speaking comfortably in full sentences. ABDOMEN: No tenderness. Soft. Non-distended. No masses. No organomegaly. No guarding or rebound. MUSCULOSKELETAL: No extremity edema. Compartments soft. No deformity. No tenderness in the extremities. All extremities neurovascularly intact. SKIN: Warm and dry. No acute rashes. NEUROLOGICAL: Alert and oriented. CN's 2-12 intact. No gross facial drooping. Strength 5/5, sensation intact. Gait normal.  PSYCHIATRIC: Agitated    LABS  I have reviewed all labs for this visit.    Results for orders placed or performed during the hospital encounter of 09/12/22   COVID-19, Rapid    Specimen: Nasopharyngeal Swab   Result Value Ref Range    SARS-CoV-2, NAAT Not Detected Not Detected   CBC with Auto Differential   Result Value Ref Range    WBC 10.0 4.0 - 11.0 K/uL    RBC 4.40 4.20 - 5.90 M/uL    Hemoglobin 14.8 13.5 - 17.5 g/dL    Hematocrit 43.3 40.5 - 52.5 %    MCV 98.2 80.0 - 100.0 fL    MCH 33.7 26.0 - 34.0 pg    MCHC 34.3 31.0 - 36.0 g/dL    RDW 13.1 12.4 - 15.4 %    Platelets 146 123 - 561 K/uL    MPV 6.8 5.0 - 10.5 fL    Neutrophils % 75.5 %    Lymphocytes % 14.7 %    Monocytes % 8.8 %    Eosinophils % 0.6 %    Basophils % 0.4 %    Neutrophils Absolute 7.5 1.7 - 7.7 K/uL    Lymphocytes Absolute 1.5 1.0 - 5.1 K/uL Monocytes Absolute 0.9 0.0 - 1.3 K/uL    Eosinophils Absolute 0.1 0.0 - 0.6 K/uL    Basophils Absolute 0.0 0.0 - 0.2 K/uL   Comprehensive Metabolic Panel w/ Reflex to MG   Result Value Ref Range    Sodium 140 136 - 145 mmol/L    Potassium reflex Magnesium 4.1 3.5 - 5.1 mmol/L    Chloride 102 99 - 110 mmol/L    CO2 24 21 - 32 mmol/L    Anion Gap 14 3 - 16    Glucose 108 (H) 70 - 99 mg/dL    BUN 21 (H) 7 - 20 mg/dL    Creatinine 0.9 0.9 - 1.3 mg/dL    GFR Non-African American >60 >60    GFR African American >60 >60    Calcium 9.9 8.3 - 10.6 mg/dL    Total Protein 7.8 6.4 - 8.2 g/dL    Albumin 5.2 (H) 3.4 - 5.0 g/dL    Albumin/Globulin Ratio 2.0 1.1 - 2.2    Total Bilirubin 0.5 0.0 - 1.0 mg/dL    Alkaline Phosphatase 76 40 - 129 U/L    ALT 15 10 - 40 U/L    AST 19 15 - 37 U/L   Ethanol   Result Value Ref Range    Ethanol Lvl None Detected mg/dL   Acetaminophen Level   Result Value Ref Range    Acetaminophen Level <5 (L) 10 - 30 ug/mL   Salicylate   Result Value Ref Range    Salicylate, Serum <6.8 (L) 15.0 - 30.0 mg/dL   Drug screen multi urine   Result Value Ref Range    Amphetamine Screen, Urine Neg Negative <1000ng/mL    Barbiturate Screen, Ur Neg Negative <200 ng/mL    Benzodiazepine Screen, Urine Neg Negative <200 ng/mL    Cannabinoid Scrn, Ur POSITIVE (A) Negative <50 ng/mL    Cocaine Metabolite Screen, Urine Neg Negative <300 ng/mL    Opiate Scrn, Ur Neg Negative <300 ng/mL    PCP Screen, Urine Neg Negative <25 ng/mL    Methadone Screen, Urine Neg Negative <300 ng/mL    Oxycodone Urine Neg Negative <100 ng/ml    FENTANYL SCREEN, URINE Neg Negative <50 ng/mL    pH, UA 5.0     Drug Screen Comment: see below      RADIOLOGY  No orders to display     ED COURSE/MDM  Patient seen and evaluated. Old records reviewed. Labs and imaging reviewed and results discussed with patient. Patient is a 76-year-old male presenting with concerns for suicidal ideation. Full HPI as detailed above.   Upon arrival in the ED, vitals remarkable for mild tachycardia but otherwise reassuring. Patient is resting comfortably and is in no acute distress. Labs were performed and are reassuring. He was placed on a hold by police prior to arrival in the ED. I have performed a medical clearance examination on this patient. It is my opinion that no medical conditions were discovered that would preclude admission to a behavioral health unit or discharge home. I feel that the patient is medically stable for disposition by the behavioral health team at this time. I, Dr. Shaunna Sandoval MD, am the primary clinician of record. Is this patient to be included in the SEP-1 Core Measure? No   Exclusion criteria - the patient is NOT to be included for SEP-1 Core Measure due to: Infection is not suspected     During the patient's ED course, the patient was given:  Medications - No data to display     CLINICAL IMPRESSION  1. Suicidal ideation        Blood pressure 129/70, pulse (!) 101, temperature 98 °F (36.7 °C), temperature source Oral, resp. rate 16, height 5' 6\" (1.676 m), weight 150 lb (68 kg), SpO2 97 %. DISPOSITION  Hugh Caal was signed out to oncoming physician in stable condition pending behavioral health evaluation and recommendations. Patient was given scripts for the following medications. I counseled patient how to take these medications. New Prescriptions    No medications on file       Follow-up with:  No follow-up provider specified. DISCLAIMER: This chart was created using Dragon dictation software. Efforts were made by me to ensure accuracy, however some errors may be present due to limitations of this technology and occasionally words are not transcribed correctly.        Shaunna Sandoval MD  09/12/22 9301

## 2022-09-13 NOTE — ED NOTES
Patient given box lunch and gatorade. Denied any further needs at this time.       Pipo Meeks RN  09/12/22 [id number]

## 2022-09-13 NOTE — ED NOTES
Call paced to [facility], [address] [place] [address] and unable to accommodate patient.       Emelia Griffin RN  09/13/22 2005

## 2022-09-13 NOTE — ED NOTES
Pt is currently laying in bed in room. Respirations are even and easy. Independently turning self for comfort. Currently laying on left side side. No s/s of distress.        Claire Murillo RN  09/13/22 9807

## 2022-09-13 NOTE — ED PROVIDER NOTES
Emergency Department Encounter    Patient: Nita Trujillo  MRN: [id number]  : 1980  Date of Evaluation: 2022  ED Provider:  Jessica Malik MD    Briefly, Nita Trujillo is a 42 y.o. male signed out to me by previous physician. Patient is a 25-year-old male presenting for suicidal ideation. Patient was pink slipped prior to presentation. Patient is homeless and from [place]. Patient states he is suicidal.  Denies plan to kill himself but told previous physician if I cannot get back to [place] I might kill myself. Patient has been medically cleared by previous physician. Currently pending psychiatric evaluation and disposition.     I have reviewed and interpreted all of the currently available lab results from this visit (if applicable)  Results for orders placed or performed during the hospital encounter of 22   COVID-19, Rapid    Specimen: Nasopharyngeal Swab   Result Value Ref Range    SARS-CoV-2, NAAT Not Detected Not Detected   CBC with Auto Differential   Result Value Ref Range    WBC 10.0 4.0 - 11.0 K/uL    RBC 4.40 4.20 - 5.90 M/uL    Hemoglobin 14.8 13.5 - 17.5 g/dL    Hematocrit 43.3 40.5 - 52.5 %    MCV 98.2 80.0 - 100.0 fL    MCH 33.7 26.0 - 34.0 pg    MCHC 34.3 31.0 - 36.0 g/dL    RDW 13.1 12.4 - 15.4 %    Platelets 022 715 - 936 K/uL    MPV 6.8 5.0 - 10.5 fL    Neutrophils % 75.5 %    Lymphocytes % 14.7 %    Monocytes % 8.8 %    Eosinophils % 0.6 %    Basophils % 0.4 %    Neutrophils Absolute 7.5 1.7 - 7.7 K/uL    Lymphocytes Absolute 1.5 1.0 - 5.1 K/uL    Monocytes Absolute 0.9 0.0 - 1.3 K/uL    Eosinophils Absolute 0.1 0.0 - 0.6 K/uL    Basophils Absolute 0.0 0.0 - 0.2 K/uL   Comprehensive Metabolic Panel w/ Reflex to MG   Result Value Ref Range    Sodium 140 136 - 145 mmol/L    Potassium reflex Magnesium 4.1 3.5 - 5.1 mmol/L    Chloride 102 99 - 110 mmol/L    CO2 24 21 - 32 mmol/L    Anion Gap 14 3 - 16    Glucose 108 (H) 70 - 99 mg/dL    BUN 21 (H) 7 - 20 mg/dL inappropriate. Efforts were made to edit the dictations.         Lucy Cuenca MD  09/23/22 7760

## 2022-09-13 NOTE — ED NOTES
Dr. Justice Hendrix came to see patient. [facility].      DHRUV Briceno  09/13/22 2080

## 2024-05-08 NOTE — ED NOTES
Breakfast tray ordered for patient.       Jair Long RN  09/13/22 9468 Yes - the patient is able to be screened